# Patient Record
(demographics unavailable — no encounter records)

---

## 2024-11-11 NOTE — PROCEDURE
[FreeTextEntry1] : Ms. ILAN OLIVIER is a 30 year old F under evaluation for seizures x2 same day:  Differential diagnosis/localization: sz, PNES (no white blood cell elevation in ER, occurring in setting of grandfathers ), syncope (less likely, confused until in hospital) Risk factors: sleep deprivation, stress -Other issues:  bipolar disorder  EEG nl, MRI nl Low HR noted  Plan: Current recommendations are to proceed with: -Cardio eval for bradycardia q6mo, s/p ECHO/holter -Sleep hygiene -Continue psychiatric care -Continue lamotrigine 100 mg BID  x time 31min RTC 6mo or PRN

## 2024-11-11 NOTE — HISTORY OF PRESENT ILLNESS
[FreeTextEntry1] : Current meds: LTG 100mg BID at home for mood. aripiprazole,  hydroxyzine prn.  Trials:  Remote use of buproprion, clonazepam >yr ago.  Risperdone 0.5mg qhs, On adderall (stopped). Latuda  Updates:  MRI brain normal  prolonged EEG normal, low HR noted Saw cardiology - some effusion noted on ECHO, low BP Increase appetite LTG  :  no ADRs with that.  ROS:  mood ok lately.  Occ less LH with standing at times.  trying to stay hydrated. no ataxia.  working full time.  HPI:  30y F with PMHx of Bipolar Disorder, ADHD, anxiety (Dr. Janelle Koenig is her psychiatrist since 2016) presented to the ED after a witnessed seizure at home 2023.  As per family member - bro, pt was with HA, N/V, then noted to be not making sense, shaking in all four extremities simultaneously x 1-2min while sitting on a couch with her brother. Pt had a second seizure witnessed by the ED staff and was treated with stat IV Ativan 2mg IVP.  Was due to go the [facility]  at this time. According to family pt has never had a seizure in her lifetime.   No h/o LOC, szs. Chronic insomnia Pt was complaining of headache and had multiple emesis prior to the seizure. CT head unremarkable.  Mo with recurrent "passing out on zyrtec" years ago  Studies:   Ceribell mild slowing, ?Rt temp slow ECG 50BPM labs ER:  23 wbc 7.84, bmp ok, plt 163.  THC+

## 2025-06-02 NOTE — HISTORY OF PRESENT ILLNESS
[FreeTextEntry1] : This 31 year old P 0010  LMP- OCP amenorrhea presents for annual. Content on OCP, denies any vaginal irritation or change in discharge, no partner at present, voiding and stooling without issue, no change to medical/surgical or family hx; reports poor eating habits with resultant weight gain. Has successfully lost weight in the past with plant based diet. [PapSmeardate] : May 2024 [TextBox_31] : neg pap and HPV

## 2025-06-02 NOTE — PLAN
[FreeTextEntry1] : OCP renewed Resource nutritionist number provided  Lifestyle Medicine handouts regarding whole food plant-based diet provided RTO prn or for annual

## 2025-06-02 NOTE — PHYSICAL EXAM
[MA] : MA [Appropriately responsive] : appropriately responsive [Alert] : alert [No Acute Distress] : no acute distress [Soft] : soft [Non-tender] : non-tender [No Lesions] : no lesions [Oriented x3] : oriented x3 [Examination Of The Breasts] : a normal appearance [No Discharge] : no discharge [No Masses] : no breast masses were palpable [Labia Majora] : normal [Labia Minora] : normal [No Bleeding] : There was no active vaginal bleeding [Normal] : normal [Uterine Adnexae] : non-palpable [FreeTextEntry2] : Cuca [Tenderness] : nontender

## 2025-07-23 NOTE — PHYSICAL EXAM
[FreeTextEntry1] : General: small OP Constitutional: alert and in no acute distress.  Psychiatric: affect normal, insight and judgment intact. Neurologic:  Orientation: oriented to person, place, and time   Attention: normal concentrating ability and attention was not decreased.  Language: no difficulty naming common objects, repeating a phrase, writing a sentence; fluency, comprehension, and reading intact.  Fund of knowledge: displays adequate knowledge of personal past history.  Cranial Nerves: visual acuity intact bilaterally, visual fields full to confrontation, pupils equal round and reactive to light, extraocular motion intact, facial sensation intact symmetrically, face symmetrical, hearing was intact bilaterally, tongue and palate midline, head turning and shoulder shrug symmetric and there was no tongue deviation with protrusion.  Motor: muscle tone was normal in all four extremities, muscle strength was normal in all four extremities and normal bulk in all four extremities.  Coordination:. normal gait. balance was intact. there was no past-pointing. no tremor present.  Deep tendon reflexes:  Biceps right 2+. Biceps left 2+.   Triceps right 2+. Triceps left 2+.   Brachioradialis right 2+. Brachioradialis left 2+.   Patella right 2+. Patella left 2+.   Ankle jerk right 2+. Ankle jerk left 2+.  Eyes: the sclera and conjunctiva were normal.  Neck: the appearance of the neck was normal.  Musculoskeletal: no clubbing or cyanosis of the fingernails.  Skin: no lesions, rash

## 2025-07-23 NOTE — HISTORY OF PRESENT ILLNESS
[FreeTextEntry1] : Current meds: LTG 100mg BID at home for mood. aripiprazole,  hydroxyzine/trazadone prn. Trials:  Remote use of buproprion, clonazepam >yr ago.  Risperdone 0.5mg qhs, was on adderall (stopped). Latuda  Updates:  Increased appetite, wt gain LTG  :  no ADRs with that.  Some dizziness with squatting working on garden, hot day.  Occ dizziness with rapid standing.   MRI brain normal  prolonged EEG normal, low HR noted Saw cardiology - some effusion noted on ECHO, low BP  ROS:  mood ok lately.  Occ less LH with standing at times.  Ttrying to stay hydrated. no ataxia.  working full time. Insomnia taking Rx 9PM till 6AM, frequent wakening  HPI:  31y F with PMHx of Bipolar Disorder, ADHD, anxiety (Dr. Janelle Koenig is her psychiatrist since ) presented to the ED after a witnessed seizure at home 2023:  As per family member - bro, pt was with HA, N/V, then noted to be not making sense, shaking in all four extremities simultaneously x 1-2min while sitting on a couch with her brother. Pt had a second seizure witnessed by the ED staff and was treated with stat IV Ativan 2mg IVP.  Was due to go the [facility]  at this time. According to family pt has never had a seizure in her lifetime.   No h/o LOC, szs. Chronic insomnia Pt was complaining of headache and had multiple emesis prior to the seizure. CT head unremarkable.  Mo with recurrent "passing out on zyrtec" "years ago"  Studies:   Ceribell mild slowing, ?Rt temp slow ECG 50BPM labs ER:  23 wbc 7.84, bmp ok, plt 163.  THC+

## 2025-07-23 NOTE — PROCEDURE
[FreeTextEntry1] : 31 year old F under evaluation for seizures x2 same day:  Differential diagnosis/localization: sz, PNES (no white blood cell elevation in ER, occurring in setting of grandfathers ), syncope (less likely, confused until in hospital) Risk factors: sleep deprivation, stress -Other issues:  bipolar disorder - Dry mouth likely due to abilify/trazadone - Sleep issues, risk for MOLLY  EEG nl, MRI nl Low HR noted  Plan: Current recommendations are to proceed with: -Cardio eval for bradycardia q6mo, s/p ECHO/holter -Sleep hygiene -Continue psychiatric care -Continue lamotrigine 100 mg BID -Decreased caffeine - sleep hygiene, consider sleep study  x time 31min RTC 6mo or PRN